# Patient Record
(demographics unavailable — no encounter records)

---

## 2024-10-14 NOTE — PHYSICAL EXAM
[Well Developed] : well developed [Well Nourished] : well nourished [No Acute Distress] : no acute distress [Normal Conjunctiva] : normal conjunctiva [No Xanthelasma] : no xanthelasma [Normal Venous Pressure] : normal venous pressure [No Carotid Bruit] : no carotid bruit [Normal Appearance] : was normal in appearance [Neck Supple] : was supple [Neck Circum ___ cm] : the neck circumference was [unfilled] cm [No Tracheal Deviation] : the trachea was midline [Normal S1, S2] : normal S1, S2 [No Rub] : no rub [5th Left ICS - MCL] : palpated at the 5th LICS in the midclavicular line [No Precordial Heave] : no precordial heave was noted [Tachycardia] : tachycardic [Heart Rate ___] : [unfilled] bpm [Rhythm Regular] : regular [Normal S1] : normal S1 [Normal S2] : normal S2 [No Gallop] : no gallop heard [No Murmur] : no murmurs heard [No Pitting Edema] : no pitting edema present [2+] : left 2+ [No Abnormalities] : the abdominal aorta was not enlarged and no bruit was heard [Clear Lung Fields] : clear lung fields [Good Air Entry] : good air entry [No Respiratory Distress] : no respiratory distress  [Normal Rhythm/Effort] : normal respiratory rhythm and effort [Clear Bilaterally] : the lungs were clear to auscultation bilaterally [Normal Breath Sounds] : breath sounds were normal [Soft] : abdomen soft [Non Tender] : non-tender [No Masses/organomegaly] : no masses/organomegaly [Normal Bowel Sounds] : normal bowel sounds [Obese] : obese [Striae] : striae were noted [Right Flank] : in the right flank [Left Flank] : in the left flank [Soft, Nontender] : the abdomen was soft and nontender [Firm] : firm [No Mass] : no masses were palpated [No HSM] : no hepatosplenomegaly noted [None] : no hernias were palpable [Normal Gait] : normal gait [No Edema] : no edema [No Cyanosis] : no cyanosis [No Clubbing] : no clubbing [No Varicosities] : no varicosities [No Rash] : no rash [No Skin Lesions] : no skin lesions [Normal] : moves all extremities, no focal deficits, normal speech [Moves all extremities] : moves all extremities [No Focal Deficits] : no focal deficits [Normal Speech] : normal speech [Alert and Oriented] : alert and oriented [Normal memory] : normal memory

## 2024-10-14 NOTE — COUNSELING
[Cessation strategies including cessation program discussed] : Cessation strategies including cessation program discussed [Use of nicotine replacement therapies and other medications discussed] : Use of nicotine replacement therapies and other medications discussed [Encouraged to pick a quit date and identify support needed to quit] : Encouraged to pick a quit date and identify support needed to quit [Yes] : Willing to quit smoking [Smoking Cessation Program Referral] : Smoking Cessation Program Referral

## 2024-10-14 NOTE — DISCUSSION/SUMMARY
[Possible Cardiac Ischemia (Intermd Prob)] : possible cardiac ischemia (intermediate probability) [Musculoskeletal Chest Pain] : musculoskeletal chest pain [Costochondritis] : costochondritis [Hyperlipidemia] : hyperlipidemia [Diet Modification] : diet modification [Exercise] : exercise [<130] : goal is <130 [<150] : goal is <150 [>40] : goal is >40 [Sleep Apnea] : sleep apnea [Isolated Elevated BP] : isolated elevated blood pressure [None] : There are no changes in medication management [Exercise Regimen] : an exercise regimen [Dietary Modification] : dietary modification [Smoking Cessation] : smoking cessation [Weight Loss] : weight loss [ETOH Moderation] : alcohol moderation [Low Sodium Diet] : low sodium diet [NSAIDs Avoidance] : non-steroidal anti-inflammatory drugs avoidance [Anxiety] : anxiety disorder NOS [Sinus Tachycardia] : sinus tachycardia [Stable] : stable [Stress Test Treadmill] : an exercise treadmill test [Stress Echocardiogram] : a stress echocardiogram [Patient] : the patient [At Goal] : The triglyceride level is not at goal [de-identified] : chest pain is very focal, nonexertional and intermittent. However, there are risk factors for CAD (cigarette smoking, HLD, class II obesity). ECG 10/22/21 showed normal sinus rhythm at 89 bpm, with short Mal (Mal 110 ms). ST-segments and T-waves were normal. Rhythm / RR - interval analysis 10/22/21 observed 138 beats over 90 sec with mean HR 95 bpm; mean  ms (594-664); Max/Min 111%; RR SD 17 and RR CV 2%. There were no PVCs or PACs [de-identified] : 148 mg/dL on 10/19/21 [FreeTextEntry5] : 249 mg/dL on 10/19/21 (goal is for fasting blood) [de-identified] : 44 mg/dL on 10/19/21 [FreeTextEntry1] : Total cholesterol was 230 mg/dL on 10/19/21 (goal < 200). Hgb A1C was 4.7% on 10/19/21. The normal range is 4.0-5.6%. According to the American Diabetes Association (ADA), levels between 5.7-6.4% are high risk for diabetes (e.g. pre-diabetic) and levels >= 6.5% are consistent with the diagnosis of diabetes. [de-identified] : overall BP was at goal. Initial office BP, obtained by physician, was 131/83 mm Hg. Fourteen serial automated (Dinamap A?) hemodynamic measurements were obtained. Mean BP for all automated measurements was 132/80  3.7/4.2 mm Hg with   7.6 bpm and oxygen saturation by pulse oximetry 99  0.5% on room air. The last seated BP was 132/77 mm Hg with  bpm. Upon standing, BP decreased to 125/76 mm Hg and HR increased to 117 bpm. The patient did not experience lightheadedness or palpitations upon standing. The last standing BP was 135/77 mm Hg with  bpm. Blood cell counts were normal on 10/19/21. Serum creatinine was 0.89 mg/dL on 10/19/21 (normal 0.6 - 1.35). Estimated glomerular filtration rate (eGFR) was 115 mL/min/1.73 sqm on 10/19/21. Serum potassium was 4.6 mmol/L on 10/19/21 (normal 3.5 - 5.3). Serum calcium was 9.8 mg/dL on 10/19/21 (normal 8.6 - 10.3). Urinalysis noted trace ketones, no casts, no protein and no glucose on 10/19/21. TSH was 1/43 uIU/mL on 10/19/21 (normal 0.4 - 4.5). [de-identified] : there is resting tachycardia. ECG 10/22/21 showed normal sinus rhythm at 89 bpm, with short Mal (Mal 110 ms). ST-segments and T-waves were normal. Rhythm / RR - interval analysis 10/22/21 observed 138 beats over 90 sec with mean HR 95 bpm; mean  ms (594-664); Max/Min 111%; RR SD 17 and RR CV 2%. There were no PVCs or PACs. Short Mal may be associated tachyarrhythmias (eg. SVT). There is baseline sinus tachycardia, likely due to physical deconditioning with class II obesity.  There was no anemia on labs 10/19/21. Hgb 15.5 g/dL on 10/19/21 (normal 13/2 - 17.1). Hct 44.3% on 10/19/21 (normal 38.5 - 50%).  [de-identified] : Treatment with ivabradine or beta blocker may be considered if there is insufficient HR response to lifestyle modifications (smoking cessation, reduction of alcohol consumption and weight loss)

## 2024-10-17 NOTE — DISCUSSION/SUMMARY
[FreeTextEntry1] : WEIGHT GOALS:  Category				BMI range - kg/m2	BMI Prime Very severely underweight		less than 15		less than 0.60	 Severely underweight			from 15.0 to 16.0		from 0.60 to 0.64	 Underweight				from 16.0 to 18.5		from 0.64 to 0.74	 Normal (healthy weight)			from 18.5 to 25		from 0.74 to 1.0	 Overweight				from 25 to 30		from 1.0 to 1.2	 Obese Class I (Moderately obese)		from 30 to 35		from 1.2 to 1.4	 Obese Class II (Severely obese)		from 35 to 40		from 1.4 to 1.6	 Obese Class III (Very severely obese)	over 40			over 1.6	  Your current weight is 272 lbs (294 lbs on 10/22/21. Given current weight and height 6'1.5", your calculated body mass index (BMI) is 35.9 kg/sqm. Normal BMI is 18.5-25 kg/sqm. Thus, current weight is in the obese class II category. Abdominal waist circumference is measured at the level of the umbilicus and is thus not a pants waist measurement. Your current abdominal waist circumference is 45" (47.5" on 10/22/21)..Normal abdominal waist circumference is < 32 inches for women and < 37 inches for men.  MEDITERRANEAN DIET: The Mediterranean diet does not limit calories. Instead, it provides guidelines for what foods you should eat more of and what foods you should eat less of.  Every day:  - Eat several servings of plant foods. These include fruits and vegetables, potatoes, breads and whole grains, beans, nuts, and seeds. As much as you can, eat fresh rather than canned or frozen foods.  - Use olive oil for cooking and salad dressings. You can have up to 35% of your calories from fats, including olive and other vegetable oils. However, limit the amount you eat of saturated fats like butter, margarine, lard, and animal fat. They should add up to no more than 8 percent of your total calories. Include "partially hydrogenated" oils in your saturated fat limit.  - Every day, have a small amount of cheese and yogurt. Look for low-fat and non-fat types. Think of cheese as a condiment like salt or pepper, not a main dish. For example, you can sprinkle a little Parmesan cheese on pasta or put a little cheese in a salad. Cheese contains saturated fat. Mozzarella cheese is much lower in fat than most types of cheese.  - If alcohol is not a problem for you, have one (for women) or two (for men) glasses of red wine with dinner. If alcohol is not a good idea for you, try adding grape juice to your diet instead. It gives you the same heart-protecting benefits of wine without alcohol. It is very sweet, so you might use it for your "dessert."  Every week:  - Eat some fish and poultry. Do not eat chicken or turkey skin.  - Eat no more than 4 eggs a week, including eggs used in cooking. Egg yolks contain saturated fat.  - Eat no more than 2 or 3 sweets or desserts that contain lots of sugar or honey. Try fresh fruit or sweets made with fruit concentrate instead.  Every month:  - Eat no more than 12 to 16 ounces of red meat. Lean meat is preferable.  SMOKING CESSATION / NICOTINE DEPENDENCE: Spoke with the patient for approximately 15 min regarding nicotine dependence. Counseled patient on current vaping and nicotine dependence and the effects of smoking on the patient's health / comorbidities, family and friends (emotional and secondhand smoke) and finances. Options for pharmacotherapy provided including, reviewing use of nicotine patches and bupropion (Wellbutrin) Varenicline (Chantix) is another option. The patient was also informed of options to call 8-312 [employer], text [phone] and visit www.[employer] for further information, an online quit , or [employer] (6-week step-by-step text messaging program). After discussing pharmacotherapy options, the patent stated that they were willing to attempt to quit at this time, with strong motivation from significant partner.

## 2024-10-17 NOTE — COUNSELING
[Cessation strategies including cessation program discussed] : Cessation strategies including cessation program discussed [Use of nicotine replacement therapies and other medications discussed] : Use of nicotine replacement therapies and other medications discussed [Encouraged to pick a quit date and identify support needed to quit] : Encouraged to pick a quit date and identify support needed to quit [Smoking Cessation Program Referral] : Smoking Cessation Program Referral  [Yes] : Risk of tobacco use and health benefits of smoking cessation discussed: Yes [FreeTextEntry2] : [place] Quits [FreeTextEntry3] : 15

## 2024-10-17 NOTE — CARDIOLOGY SUMMARY
[de-identified] : 10/17/24, normal sinus rhythm at 89 bpm. ST-segments and T-waves were normal. Mal 128 ms. QRSd 109 ms. Prior ECG 10/22/21 showed normal sinus rhythm at 89 bpm, with short Mal (Mal 110 ms). ST-segments and T-waves were normal.  Rhythm / RR - interval analysis 10/17/24 observed 132 beats over 90 sec with mean HR 87 bpm; mean  ms (630-1038); Max/Min 164%; RR SD 36 and RR CV 5%. There were no PVCs or PACs [de-identified] : 12/15/21, exercised 7:31 Shan protocol. Baseline HR 86 bpm increased to peak 176 bpm (93% MPHR). Baseline /80 mm Hg increased to peak 168/90 mm Hg. There were no ischemic ST-segment changes and no arrhythmia with stress. There was no evidence of infarction or stress induced ischemia. LV wall motion was normal with post-stress LVEF >=44% and LVEDV 130 mL; normal LV function by visual inspection, though unable to precisely quantify EF. RV function appeared normal.   12/3/21, exercise capacity 7 METS, suboptimal for age and gender. He exercised 7:15 Shan protocol. Baseline HR 86 bpm increased to peak 176 bpm (93% MPHR). Baseline /84 mm Hg increased to peak 184/92 mm Hg. There was excessive increase in HR for level of exercise. There were no ischemic ST-segment changes with exercise. There was no arrhythmia [de-identified] : 12/3/21, normal mitral valve, aortic root and trileaflet aortic valve. The left atrium was normal. The left ventricle was normal in size and wall thickness. There was mild apical hypokinesis. Overall LV systolic function was normal with LVEF 55-60%. There was normal LV diastolic function. The right atrium appeared normal. The right ventricle was normal in size and function. The tricuspid and pulmonic valves were normal, with minimal pulmonic regurgitation. The pericardium appeared normal with no effusion.

## 2024-10-17 NOTE — HISTORY OF PRESENT ILLNESS
[FreeTextEntry1] : Toni Calhoun presented with h/o cluster HA, depression, Vit D deficiency, HLD, class II obesity, SG (on nasal CPAP) cigarette smoking, and elevated office BP (without evidence of HTN). He was initially seen 10/22/21 with resting elevated HR, sudden increase in HR to 130 bpm and focal left sided chest pressure. He was in good health until undergoing divorce. Since then, there was increased stress and depression. He started smoking, up to 1 PPD. He used Nicorette gum and nicotine patch. He is currently attempting to quit, last cigarette 10/6/21. There were intermittent mild to moderate cravings. His older brother also recently stopped smoking. Since start of COVID pandemic, there was stress eating, weight; 218 lbs in Mar 2020 to peak 297 lbs. There was subsequent increase in blood lipids. Atorvastatin 10 mg daily was started but stopped due to elevated hepatic enzymes. There was increase in resting HR since weight gain, typically 100 - 115 bpm on Apple Watch (tm), sometimes 130 bpm, particularly after consuming EtOH. He cut back on consumption, noting peak consumption 3-5 beers on Friday nights. There was no corresponding lightheadedness. There was intermittent, non-exertional, focal left sided chest pressure, radiating straight through to left scapula, no associated dyspnea, diaphoresis or palpitations. During office visit 10/19/21, /110 mm Hg. Self-obtained /80 mm Hg. There was significant job stress until recent change in job. He used sumatriptan for cluster headaches, last headache 4 yrs prior (2017). He was on Wellbutrin for depression with no improvement. Current mood significantly improved on olanzapine (Zyprexa) and venlafaxine (Effexor). He slept 23:00 t0 7:30, waking refreshed and without headache. Last sumatriptan use for cluster headache, 2017. There was heavy snoring and he was diagnosed with SG, now tolerating nasal CPAP. He was hospitalized 3/4/- 3/5/24 s/p lap maryuri for acute cholecystitis on 3/4/24, He started rosuvastatin Aug 2024 and has been on tirzepatide (Zepbound) for weight loss. He vaped for nicotine, but was attempting to quit with bupropion, transdermal nicotine and Zyn nicotine pouches. He consumed 2-6 glasses EtOH per month. Weight peaked at 310 lbs, now 272 lbs on Zepbound with lifestyle modifications.

## 2024-10-17 NOTE — DISCUSSION/SUMMARY
[FreeTextEntry1] : WEIGHT GOALS:  Category				BMI range - kg/m2	BMI Prime Very severely underweight		less than 15		less than 0.60	 Severely underweight			from 15.0 to 16.0		from 0.60 to 0.64	 Underweight				from 16.0 to 18.5		from 0.64 to 0.74	 Normal (healthy weight)			from 18.5 to 25		from 0.74 to 1.0	 Overweight				from 25 to 30		from 1.0 to 1.2	 Obese Class I (Moderately obese)		from 30 to 35		from 1.2 to 1.4	 Obese Class II (Severely obese)		from 35 to 40		from 1.4 to 1.6	 Obese Class III (Very severely obese)	over 40			over 1.6	  Your current weight is 272 lbs (294 lbs on 10/22/21. Given current weight and height 6'1.5", your calculated body mass index (BMI) is 35.9 kg/sqm. Normal BMI is 18.5-25 kg/sqm. Thus, current weight is in the obese class II category. Abdominal waist circumference is measured at the level of the umbilicus and is thus not a pants waist measurement. Your current abdominal waist circumference is 45" (47.5" on 10/22/21)..Normal abdominal waist circumference is < 32 inches for women and < 37 inches for men.  MEDITERRANEAN DIET: The Mediterranean diet does not limit calories. Instead, it provides guidelines for what foods you should eat more of and what foods you should eat less of.  Every day:  - Eat several servings of plant foods. These include fruits and vegetables, potatoes, breads and whole grains, beans, nuts, and seeds. As much as you can, eat fresh rather than canned or frozen foods.  - Use olive oil for cooking and salad dressings. You can have up to 35% of your calories from fats, including olive and other vegetable oils. However, limit the amount you eat of saturated fats like butter, margarine, lard, and animal fat. They should add up to no more than 8 percent of your total calories. Include "partially hydrogenated" oils in your saturated fat limit.  - Every day, have a small amount of cheese and yogurt. Look for low-fat and non-fat types. Think of cheese as a condiment like salt or pepper, not a main dish. For example, you can sprinkle a little Parmesan cheese on pasta or put a little cheese in a salad. Cheese contains saturated fat. Mozzarella cheese is much lower in fat than most types of cheese.  - If alcohol is not a problem for you, have one (for women) or two (for men) glasses of red wine with dinner. If alcohol is not a good idea for you, try adding grape juice to your diet instead. It gives you the same heart-protecting benefits of wine without alcohol. It is very sweet, so you might use it for your "dessert."  Every week:  - Eat some fish and poultry. Do not eat chicken or turkey skin.  - Eat no more than 4 eggs a week, including eggs used in cooking. Egg yolks contain saturated fat.  - Eat no more than 2 or 3 sweets or desserts that contain lots of sugar or honey. Try fresh fruit or sweets made with fruit concentrate instead.  Every month:  - Eat no more than 12 to 16 ounces of red meat. Lean meat is preferable.  SMOKING CESSATION / NICOTINE DEPENDENCE: Spoke with the patient for approximately 15 min regarding nicotine dependence. Counseled patient on current vaping and nicotine dependence and the effects of smoking on the patient's health / comorbidities, family and friends (emotional and secondhand smoke) and finances. Options for pharmacotherapy provided including, reviewing use of nicotine patches and bupropion (Wellbutrin) Varenicline (Chantix) is another option. The patient was also informed of options to call 9-050 [employer], text [phone] and visit www.[employer] for further information, an online quit , or [employer] (6-week step-by-step text messaging program). After discussing pharmacotherapy options, the patent stated that they were willing to attempt to quit at this time, with strong motivation from significant partner.

## 2024-10-17 NOTE — REVIEW OF SYSTEMS
[Negative] : Heme/Lymph [Weight Loss (___ Lbs)] : [unfilled] ~Ulb weight loss [Fever] : no fever [Headache] : no headache [Weight Gain (___ Lbs)] : no recent weight gain [Chills] : no chills [Feeling Fatigued] : not feeling fatigued

## 2024-10-17 NOTE — ADDENDUM
[FreeTextEntry1] : I spent 60 min face to face time with the patient, from 13:00 to 14:00 on 10/17/24. Prior to this visit, I reviewed findings from echocardiogram, treadmill stress test, nuclear stress, pulmonary sleep medicine assessment and surgical office notes. I reviewed hospital records 3/4 - 3/5/24. Labs 10/15/24 were reviewed. This preparation took 30 minutes. Thus, total visit time was 90 min

## 2024-10-17 NOTE — PHYSICAL EXAM
[Well Developed] : well developed [Well Nourished] : well nourished [No Acute Distress] : no acute distress [Normal Conjunctiva] : normal conjunctiva [No Xanthelasma] : no xanthelasma [Normal Venous Pressure] : normal venous pressure [No Carotid Bruit] : no carotid bruit [Normal Appearance] : was normal in appearance [Neck Supple] : was supple [Neck Circum ___ cm] : the neck circumference was [unfilled] cm [No Tracheal Deviation] : the trachea was midline [Normal S1, S2] : normal S1, S2 [No Rub] : no rub [5th Left ICS - MCL] : palpated at the 5th LICS in the midclavicular line [No Precordial Heave] : no precordial heave was noted [Tachycardia] : tachycardic [Heart Rate ___] : [unfilled] bpm [Rhythm Regular] : regular [Normal S1] : normal S1 [Normal S2] : normal S2 [No Gallop] : no gallop heard [No Murmur] : no murmurs heard [No Pitting Edema] : no pitting edema present [2+] : left 2+ [No Abnormalities] : the abdominal aorta was not enlarged and no bruit was heard [Clear Lung Fields] : clear lung fields [Good Air Entry] : good air entry [No Respiratory Distress] : no respiratory distress  [Normal Rhythm/Effort] : normal respiratory rhythm and effort [Clear Bilaterally] : the lungs were clear to auscultation bilaterally [Normal Breath Sounds] : breath sounds were normal [Soft] : abdomen soft [Non Tender] : non-tender [No Masses/organomegaly] : no masses/organomegaly [Normal Bowel Sounds] : normal bowel sounds [Obese] : obese [Striae] : striae were noted [Right Flank] : in the right flank [Left Flank] : in the left flank [Soft, Nontender] : the abdomen was soft and nontender [Firm] : firm [No Mass] : no masses were palpated [No HSM] : no hepatosplenomegaly noted [None] : no hernias were palpable [Normal Gait] : normal gait [No Edema] : no edema [No Cyanosis] : no cyanosis [No Clubbing] : no clubbing [No Varicosities] : no varicosities [No Rash] : no rash [No Skin Lesions] : no skin lesions [Normal] : moves all extremities, no focal deficits, normal speech [Moves all extremities] : moves all extremities [No Focal Deficits] : no focal deficits [Normal Speech] : normal speech [Alert and Oriented] : alert and oriented [Normal memory] : normal memory [Swelling] : was not swollen [Tenderness] : was non-tender [Mass (___cm)] : had no masses [JVP Elevated ___cm] : the JVP was not elevated [Hepatojugular Reflux] : no sustained hepatojugular reflux [Kussmaul's Sign] : no Kussmaul's sign [Apical Thrill] : no thrill palpable at the apex [Click] : no click [Pericardial Rub] : no pericardial rub [Rt] : no varicose veins of the right leg [Lt] : no varicose veins of the left leg [Right Carotid Bruit] : no bruit heard over the right carotid [Left Carotid Bruit] : no bruit heard over the left carotid [Acc Muscles Use] : no accessory muscle use [Distress] : no respiratory distress [Nasal Flaring] : no nasal flaring [Supraclavicular Retractions] : no supraclavicular retractions [Intercostal Retractions] : no intercostal retractions [Dry Cough] : no dry cough [Prolonged Exp Time] : expiratory time was not prolonged [Increased E/I Ratio] : no increase in the expiratory/inspiratory ratio  [Exp Wheezing] : no expiratory wheezing was heard [Insp Wheezing] : no inspiratory wheezing was heard [Scar] : no scars [Epigastric] : not in the epigastric area [Periumbilical] : not periumbilical [Suprapubic] : not in the suprapubic area [Liver Tender To Palpation] : not tender [Liver Enlarged] : not enlarged [de-identified] : Mallampati Score: class II with complete visualization of the uvula [de-identified] : neck circumference 18.5"; previously 19 " [de-identified] : right ankle circumf 9.5 "; left ankle 9.5 " (previously 10" and 10" respectively)

## 2024-10-17 NOTE — PHYSICAL EXAM
[Well Developed] : well developed [Well Nourished] : well nourished [No Acute Distress] : no acute distress [Normal Conjunctiva] : normal conjunctiva [No Xanthelasma] : no xanthelasma [Normal Venous Pressure] : normal venous pressure [No Carotid Bruit] : no carotid bruit [Normal Appearance] : was normal in appearance [Neck Supple] : was supple [Neck Circum ___ cm] : the neck circumference was [unfilled] cm [No Tracheal Deviation] : the trachea was midline [Normal S1, S2] : normal S1, S2 [No Rub] : no rub [5th Left ICS - MCL] : palpated at the 5th LICS in the midclavicular line [No Precordial Heave] : no precordial heave was noted [Tachycardia] : tachycardic [Heart Rate ___] : [unfilled] bpm [Rhythm Regular] : regular [Normal S1] : normal S1 [Normal S2] : normal S2 [No Gallop] : no gallop heard [No Murmur] : no murmurs heard [No Pitting Edema] : no pitting edema present [2+] : left 2+ [No Abnormalities] : the abdominal aorta was not enlarged and no bruit was heard [Clear Lung Fields] : clear lung fields [Good Air Entry] : good air entry [No Respiratory Distress] : no respiratory distress  [Normal Rhythm/Effort] : normal respiratory rhythm and effort [Clear Bilaterally] : the lungs were clear to auscultation bilaterally [Normal Breath Sounds] : breath sounds were normal [Soft] : abdomen soft [Non Tender] : non-tender [No Masses/organomegaly] : no masses/organomegaly [Normal Bowel Sounds] : normal bowel sounds [Obese] : obese [Striae] : striae were noted [Right Flank] : in the right flank [Left Flank] : in the left flank [Soft, Nontender] : the abdomen was soft and nontender [Firm] : firm [No Mass] : no masses were palpated [No HSM] : no hepatosplenomegaly noted [None] : no hernias were palpable [Normal Gait] : normal gait [No Edema] : no edema [No Cyanosis] : no cyanosis [No Clubbing] : no clubbing [No Varicosities] : no varicosities [No Rash] : no rash [No Skin Lesions] : no skin lesions [Normal] : moves all extremities, no focal deficits, normal speech [Moves all extremities] : moves all extremities [No Focal Deficits] : no focal deficits [Normal Speech] : normal speech [Alert and Oriented] : alert and oriented [Normal memory] : normal memory [Swelling] : was not swollen [Tenderness] : was non-tender [Mass (___cm)] : had no masses [JVP Elevated ___cm] : the JVP was not elevated [Hepatojugular Reflux] : no sustained hepatojugular reflux [Kussmaul's Sign] : no Kussmaul's sign [Apical Thrill] : no thrill palpable at the apex [Click] : no click [Pericardial Rub] : no pericardial rub [Rt] : no varicose veins of the right leg [Lt] : no varicose veins of the left leg [Right Carotid Bruit] : no bruit heard over the right carotid [Left Carotid Bruit] : no bruit heard over the left carotid [Acc Muscles Use] : no accessory muscle use [Distress] : no respiratory distress [Nasal Flaring] : no nasal flaring [Supraclavicular Retractions] : no supraclavicular retractions [Intercostal Retractions] : no intercostal retractions [Dry Cough] : no dry cough [Prolonged Exp Time] : expiratory time was not prolonged [Increased E/I Ratio] : no increase in the expiratory/inspiratory ratio  [Exp Wheezing] : no expiratory wheezing was heard [Insp Wheezing] : no inspiratory wheezing was heard [Scar] : no scars [Epigastric] : not in the epigastric area [Periumbilical] : not periumbilical [Suprapubic] : not in the suprapubic area [Liver Tender To Palpation] : not tender [Liver Enlarged] : not enlarged [de-identified] : Mallampati Score: class II with complete visualization of the uvula [de-identified] : neck circumference 18.5"; previously 19 " [de-identified] : right ankle circumf 9.5 "; left ankle 9.5 " (previously 10" and 10" respectively)

## 2024-10-17 NOTE — CARDIOLOGY SUMMARY
[de-identified] : 10/17/24, normal sinus rhythm at 89 bpm. ST-segments and T-waves were normal. Mal 128 ms. QRSd 109 ms. Prior ECG 10/22/21 showed normal sinus rhythm at 89 bpm, with short Mal (Mal 110 ms). ST-segments and T-waves were normal.  Rhythm / RR - interval analysis 10/17/24 observed 132 beats over 90 sec with mean HR 87 bpm; mean  ms (630-1038); Max/Min 164%; RR SD 36 and RR CV 5%. There were no PVCs or PACs [de-identified] : 12/15/21, exercised 7:31 Shan protocol. Baseline HR 86 bpm increased to peak 176 bpm (93% MPHR). Baseline /80 mm Hg increased to peak 168/90 mm Hg. There were no ischemic ST-segment changes and no arrhythmia with stress. There was no evidence of infarction or stress induced ischemia. LV wall motion was normal with post-stress LVEF >=44% and LVEDV 130 mL; normal LV function by visual inspection, though unable to precisely quantify EF. RV function appeared normal.   12/3/21, exercise capacity 7 METS, suboptimal for age and gender. He exercised 7:15 Shan protocol. Baseline HR 86 bpm increased to peak 176 bpm (93% MPHR). Baseline /84 mm Hg increased to peak 184/92 mm Hg. There was excessive increase in HR for level of exercise. There were no ischemic ST-segment changes with exercise. There was no arrhythmia [de-identified] : 12/3/21, normal mitral valve, aortic root and trileaflet aortic valve. The left atrium was normal. The left ventricle was normal in size and wall thickness. There was mild apical hypokinesis. Overall LV systolic function was normal with LVEF 55-60%. There was normal LV diastolic function. The right atrium appeared normal. The right ventricle was normal in size and function. The tricuspid and pulmonic valves were normal, with minimal pulmonic regurgitation. The pericardium appeared normal with no effusion.